# Patient Record
Sex: FEMALE | Race: WHITE | Employment: FULL TIME | URBAN - METROPOLITAN AREA
[De-identification: names, ages, dates, MRNs, and addresses within clinical notes are randomized per-mention and may not be internally consistent; named-entity substitution may affect disease eponyms.]

---

## 2019-09-10 ENCOUNTER — THERAPY VISIT (OUTPATIENT)
Dept: PHYSICAL THERAPY | Facility: CLINIC | Age: 39
End: 2019-09-10
Payer: COMMERCIAL

## 2019-09-10 DIAGNOSIS — M25.511 SHOULDER PAIN, RIGHT: ICD-10-CM

## 2019-09-10 PROCEDURE — 97161 PT EVAL LOW COMPLEX 20 MIN: CPT | Mod: GP | Performed by: PHYSICAL THERAPIST

## 2019-09-10 PROCEDURE — 97140 MANUAL THERAPY 1/> REGIONS: CPT | Mod: GP | Performed by: PHYSICAL THERAPIST

## 2019-09-10 PROCEDURE — 97110 THERAPEUTIC EXERCISES: CPT | Mod: GP | Performed by: PHYSICAL THERAPIST

## 2019-09-10 NOTE — PROGRESS NOTES
[place] [facility] Initial Evaluation  Subjective:  The history is provided by the patient. No  was used.                       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    Pain quality (sharp/shooting/stabbing/aching/burning/cramping):  Deep ache    Present since (onset date):  3/2018.  Pt reports today having completed 3 injections (last one in June 2019) and lessening improvement after each subsequent  Injection. Additional injection franco for Tuesday, along w f/u w Dr. Madrigal.  PT x 1 year.  Symptoms (improving/unchanging/worsening):  worsening.    Symptoms commenced as a result of: overextending arm reaching for bag while driving  Condition occurred in the following environment: general    Symptoms at onset: same  Paresthesia (yes/no):  Reports tingling with reaching out to the side  Spinal history: neck tightness   Cough/Sneeze (pos/neg):  neg    Constant symptoms: deep throbbing pain   Intermittent symptoms: none    Symptoms are worse with the following: Other - fastening bra, lying on side   Symptoms are better with the following: Other - injection temporarily    Continued use makes the pain (better/worse/no effect): worse    Disturbed night (yes/no):  Yes    Pain at rest (yes/no): yes- melody after movement  Site (neck/shoulder/elbow/wrist/hand): right shoulder, neck    Other questions (swelling/catching/clicking/locking/subluxing):  no    Previous episodes: none  Previous  treatments: PT, dry needling    Specific Questions:  General health (excellent/good/fair/poor):  good  Pertinent medical history includes: See Epic  Medications (nil/NSAIDS/analg/steroids/anticoag/other): See Epic  Medical allergies:    Imaging (None/Xray/MRI/Other):  1. Tearing of the superior portion of the glenoid labrum, in keeping with a type II SLAP lesion. No other definite labral injuries are seen.  2. No evidence for rotator cuff injury is seen.  3. No abnormalities of the acromioclavicular joint are present.  4. The long head of the biceps tendon appears intact.  5. Minimal glenohumeral joint effusion and minimal subacromial/subdeltoid bursal fluid.  Additional findings:  1. Thickening of the joint capsule in keeping with adhesive capsulitis  Recent or major surgery (yes/no): no  Night pain (yes/no): yes- mechanical  Accidents (yes/no): no  Unexplained weight loss (yes/no): no  Barriers at home: no  Other red flags: no    Sites for physical examination (neck/shoulder/elbow/wrist/hand): neck, shoulder    EXAMINATION    Posture:  Sitting (good/fair/poor): fair  Correction of posture (better/worse/no effect/NA): NE  Standing (good/fair/poor): fair  Other observations:  na    Neurological (NA/motor/sensory/reflexes/dural): normal    Baselines (pain or functional activity): reaching behind back sig limitation due to pain    Extremities (Shoulder/Elbow/Wrist/Hand): right shoulder compared to non involved    Movement Loss Vaughn Mod Min Nil Pain   Flexion   170 deg  End range   Extension  <10 deg   End range   Abduction        Internal Rotation combined w ext AROM L5 (left shoulder to T4 Supine 30 deg   End range   External Rotation  45 deg    End range   Supination        Pronation        Radial Deviation        Ulnar Deviation           Passive Movement (+/- overpressure)/(PDM/ERP):  ERP with all motions  Resisted Test Response (pain): no pain or weakness with resisted testing  Other Tests: na    Spine:  Movement  Loss: right cerv rotation, cerv ret tight right cerv.  Effect of repeated movements: rep cerv ret/ext w pt OP imp CROM and concordant right shoulder IR/Ext  Effect of static positioning: NT  Spine testing (not relevant/relevant/secondary problem): relevant        Provisional Classification (Extremity/Spine): Spine - Derangement - Asymmetrical, unilateral, symptoms above elbow      Principle of Management:  Education: Therapeutic Exercise Pt education regarding four stages of healing: pain reduction, maintenance through exercise, recovery of function, and prevention of re-occurrence. Utilized prescription dosage of exercise theory, cut finger analogy, and scientific method to help patient understand purpose of exercise specificity and importance of compliance with exercise.  Pt also educated in traffic light response to exercise, and self assessment to guide home exercise program.      Equipment provided:  no  Exercise and dosage:  Rep cerv ret/ext w pt OP to clear prior to shoulder rep testing    ASSESSMENT/PLAN:    Patient is a 39 year old female with right shoulder complaints.    Patient has the following significant findings with corresponding treatment plan.                Diagnosis 1:  Right shoulder pain  Pain -  manual therapy, self management, education, directional preference exercise and home program  Decreased ROM/flexibility - manual therapy and therapeutic exercise  Decreased joint mobility - manual therapy and therapeutic exercise  Decreased strength - therapeutic exercise and therapeutic activities  Decreased proprioception - neuro re-education and therapeutic activities  Impaired gait - gait training  Impaired muscle performance - neuro re-education  Decreased function - therapeutic activities  Impaired posture - neuro re-education    Previous and current functional limitations:  (See Goal Flow Sheet for this information)    Short term and Long term goals: (See Goal Flow Sheet for this information)      Communication ability:  Patient appears to be able to clearly communicate and understand verbal and written communication and follow directions correctly.  Treatment Explanation - The following has been discussed with the patient:   RX ordered/plan of care  Anticipated outcomes  Possible risks and side effects  This patient would benefit from PT intervention to resume normal activities.   Rehab potential is good.    Frequency:  1X week, once daily  Duration:  for 8 weeks  Discharge Plan:  Achieve all LTG.  Independent in home treatment program.  Reach maximal therapeutic benefit.    Please refer to the daily flowsheet for treatment today, total treatment time and time spent performing 1:1 timed codes.

## 2019-09-10 NOTE — PROGRESS NOTES
[place] [facility] Initial Evaluation  Subjective:    Robyn Yeager being seen for pt shoulder pain/injury.   Date of Onset: 3/2018. HPI: Documentation: over extending arm.  and reported as 5/10 on pain scale. General health as reported by patient is good. Pertinent medical history includes:  Smoking and mental illness.  Medical allergies: other.    Current medications:  Anti-depressants and anti-seizure medication.   Primary job tasks include:  Computer work.           Occupation: attornry.                           Objective:  System    Physical Exam    General     ROS    Assessment/Plan:

## 2019-09-13 ENCOUNTER — THERAPY VISIT (OUTPATIENT)
Dept: PHYSICAL THERAPY | Facility: CLINIC | Age: 39
End: 2019-09-13
Payer: COMMERCIAL

## 2019-09-13 DIAGNOSIS — M25.511 SHOULDER PAIN, RIGHT: ICD-10-CM

## 2019-09-13 PROCEDURE — 97110 THERAPEUTIC EXERCISES: CPT | Mod: GP | Performed by: PHYSICAL THERAPIST

## 2019-09-13 PROCEDURE — 99207 C STAT DRY NEEDLING - IAM: CPT | Mod: 25 | Performed by: PHYSICAL THERAPIST

## 2019-09-13 PROCEDURE — 97112 NEUROMUSCULAR REEDUCATION: CPT | Mod: GP | Performed by: PHYSICAL THERAPIST

## 2019-09-13 PROCEDURE — 97140 MANUAL THERAPY 1/> REGIONS: CPT | Mod: GP | Performed by: PHYSICAL THERAPIST

## 2019-09-13 NOTE — PROGRESS NOTES
"Obtained informed consent and performed clean dry needling with following parameters:      Muscles needled:  Right UT C7/8 bracket infraspinatus Post delt   Needle sizes:  0.3x40mm 0.4x50mm 0.3x40mm 0.3x40mm   Needle depth:  20-30mm 40 mm <20mm <20mm   Positioning:  prone \" \" \"   Technique:  piston flat flat flat   Pre/post objective measure:  Shld/neck pain \" \" \"   Adverse response:  None None None None   Clinical reasoning: Cervical referral \" \" \"     Courtney Estes, PT    "

## 2019-09-18 ENCOUNTER — THERAPY VISIT (OUTPATIENT)
Dept: PHYSICAL THERAPY | Facility: CLINIC | Age: 39
End: 2019-09-18
Payer: COMMERCIAL

## 2019-09-18 DIAGNOSIS — M25.511 SHOULDER PAIN, RIGHT: ICD-10-CM

## 2019-09-18 PROCEDURE — 97110 THERAPEUTIC EXERCISES: CPT | Mod: GP | Performed by: PHYSICAL THERAPIST

## 2019-09-18 PROCEDURE — 99207 C STAT DRY NEEDLING - IAM: CPT | Mod: 25 | Performed by: PHYSICAL THERAPIST

## 2019-09-18 PROCEDURE — 97140 MANUAL THERAPY 1/> REGIONS: CPT | Mod: GP | Performed by: PHYSICAL THERAPIST

## 2019-09-19 NOTE — PROGRESS NOTES
"Obtained informed consent and performed clean dry needling with following parameters:        Muscles needled:  Right UT  bicep    Needle sizes:  0.3x40mm  0.3x40mm    Needle depth:  20-30mm  <20mm    Positioning:  prone  \"    Technique:  piston  flat    Pre/post objective measure:  Shld/neck pain  \"    Adverse response:  None  None    Clinical reasoning: Cervical referral  \"       Courtney Estes, PT    "

## 2019-11-06 NOTE — PROGRESS NOTES
Discharge Note    Progress reporting period is from last progress note on  Sep 10 to Sep 18, 2019.    Robyn failed to follow up and current status is unknown.  Please see information below for last relevant information on current status.  Patient seen for 3 visits.    SUBJECTIVE  Subjective changes noted by patient:  Really bad yesterday- something set it off and not sure what.  Doing exercise but not sure.  .  Current pain level is 5/10.     Previous pain level was   .   Changes in function:  Yes (See Goal flowsheet attached for changes in current functional level)  Adverse reaction to treatment or activity: None    OBJECTIVE  Changes noted in objective findings: Stressors present.  Observation of ex: not to end range.and poor oform.  COrrected for and dec in trap pain.  Trial of shld ext ex, but no change within clinic     ASSESSMENT/PLAN  Diagnosis: Right shoulder pain   Updated problem list and treatment plan:   Pain - HEP  Decreased ROM/flexibility - HEP  Decreased function - HEP  Decreased strength - HEP  Impaired muscle performance - HEP  Impaired gait - HEP  Decreased proprioception - HEP  Impaired posture - HEP  Decreased joint mobility - HEP  STG/LTGs have been met or progress has been made towards goals:  Yes, please see goal flowsheet for most current information  Assessment of Progress: current status is unknown.    Last current status: Pt has experienced exacerbation of symptoms   Self Management Plans:  HEP  I have re-evaluated this patient and find that the nature, scope, duration and intensity of the therapy is appropriate for the medical condition of the patient.  Robyn continues to require the following intervention to meet STG and LTG's:  HEP.    Recommendations:  Discharge with current home program.  Patient to follow up with MD as needed.    Please refer to the daily flowsheet for treatment today, total treatment time and time spent performing 1:1 timed codes.

## 2019-12-10 PROBLEM — M25.511 SHOULDER PAIN, RIGHT: Status: RESOLVED | Noted: 2019-09-10 | Resolved: 2019-12-10
